# Patient Record
Sex: FEMALE | Race: WHITE | NOT HISPANIC OR LATINO | URBAN - METROPOLITAN AREA
[De-identification: names, ages, dates, MRNs, and addresses within clinical notes are randomized per-mention and may not be internally consistent; named-entity substitution may affect disease eponyms.]

---

## 2018-11-29 ENCOUNTER — EMERGENCY (EMERGENCY)
Facility: HOSPITAL | Age: 58
LOS: 0 days | Discharge: HOME | End: 2018-11-29
Attending: EMERGENCY MEDICINE
Payer: OTHER MISCELLANEOUS

## 2018-11-29 VITALS
HEART RATE: 67 BPM | RESPIRATION RATE: 17 BRPM | TEMPERATURE: 98 F | SYSTOLIC BLOOD PRESSURE: 138 MMHG | DIASTOLIC BLOOD PRESSURE: 67 MMHG | OXYGEN SATURATION: 98 %

## 2018-11-29 VITALS — WEIGHT: 179.9 LBS | HEIGHT: 64 IN

## 2018-11-29 DIAGNOSIS — S62.501A FRACTURE OF UNSPECIFIED PHALANX OF RIGHT THUMB, INITIAL ENCOUNTER FOR CLOSED FRACTURE: ICD-10-CM
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
## 2018-11-29 DIAGNOSIS — M79.644 PAIN IN RIGHT FINGER(S): ICD-10-CM
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
## 2018-11-29 PROCEDURE — 73140 X-RAY EXAM OF FINGER(S): CPT | Mod: 26,RT

## 2018-11-29 PROCEDURE — 99284 EMERGENCY DEPT VISIT MOD MDM: CPT

## 2018-11-29 PROCEDURE — 29130 APPL FINGER SPLINT STATIC: CPT | Mod: F5

## 2018-11-29 RX ORDER — CEPHALEXIN 500 MG
1 CAPSULE ORAL
Qty: 28 | Refills: 0 | OUTPATIENT
Start: 2018-11-29 | End: 2018-12-05

## 2018-11-29 NOTE — ED ADULT TRIAGE NOTE - CHIEF COMPLAINT QUOTE
c/o right thumb injury while at work, door closed on fingers, caused laceration. pt went to urgent center and sent to ER

## 2018-11-29 NOTE — ED STATDOCS - PROGRESS NOTE DETAILS
Patient seen and evaluated with ED attending at intake initially.  +subungual hematoma already draining without intervention, no repairable laceration.  +tuft fx.  Patient placed in splint, will treat with keflex and referred for hand follow up -Preet Dunne PA-C

## 2018-11-29 NOTE — ED ADULT NURSE NOTE - OBJECTIVE STATEMENT
Pt present to ED c/o right thumb pain s/p slamming thumb on refrigerator at work. Pt is able to move thumb, thumb is warm. Bleeding is minimal, controlled with gauze. Right thumb is swollen

## 2018-11-29 NOTE — ED STATDOCS - SKIN, MLM
skin normal color for race, warm, dry and intact. Right finger TTP intrapharyngeal joint. Small laceration of base of nail bed. Small subungual hematoma

## 2018-11-29 NOTE — ED STATDOCS - ATTENDING CONTRIBUTION TO CARE
I, Tomeka Macias MD, personally saw the patient with ACP.  I have personally performed a face to face diagnostic evaluation on this patient.  I have reviewed the ACP note and agree with the history, exam, and plan of care, except as noted.

## 2018-11-29 NOTE — ED STATDOCS - OBJECTIVE STATEMENT
59 y/o female with no significant PMHx  presents to the ED s/p finger injury. Tdap UTD. Pt notes a door closed on her hand. +laceration. Pt went to urgent care and was sent to ED. No other complaints at this time.

## 2018-11-29 NOTE — ED ADULT NURSE NOTE - NSIMPLEMENTINTERV_GEN_ALL_ED
Implemented All Universal Safety Interventions:  [place] to call system. Call bell, personal items and telephone within reach. Instruct patient to call for assistance. Room bathroom lighting operational. Non-slip footwear when patient is off stretcher. Physically safe environment: no spills, clutter or unnecessary equipment. Stretcher in lowest position, wheels locked, appropriate side rails in place.

## 2022-06-21 NOTE — ED STATDOCS - CPE ED MUSC NORM
<<<RESIDENT DISCHARGE NOTE>>>     TABBY EUBANKS  MRN-[id number]    VITAL SIGNS:  T(F): 98 (06-21-22 @ 04:00), Max: 98 (06-21-22 @ 04:00)  HR: 68 (06-21-22 @ 04:00)  BP: 117/57 (06-21-22 @ 04:00)  SpO2: --      PHYSICAL EXAMINATION:  General:  NAD  Head & Neck: NC/AT   Pulmonary: CTA BL  Cardiovascular: RRR   Gastrointestinal/Abdomen & Pelvis:  Soft, NT/ND  Neurologic/Motor: Non focal     TEST RESULTS:                        10.1   7.86  )-----------( 195      ( 20 Jun 2022 06:34 )             31.2       06-20    140  |  108  |  61<HH>  ----------------------------<  104<H>  4.3   |  21  |  1.5    Ca    8.5      20 Jun 2022 06:34  Mg     2.3     06-20        FINAL DISCHARGE INTERVIEW:  Resident(s) Present: (Name:Chilo) RN Present: (Name:  )    DISCHARGE MEDICATION RECONCILIATION  reviewed with Attending (Name:)    DISPOSITION:   [  ] Home,    [  ] Home with Visiting Nursing Services,   [  x  ]  SNF/ NH,    [   ] Acute Rehab (4A),   [   ] Other (Specify:_________) normal...

## 2024-04-22 NOTE — ED STATDOCS - CONDITION AT DISCHARGE:
SW made PRN visit to provide increased support due to pt's imminence.  Pt was received in her SNF room.  She was dressed appropriately and there are no visible signs of abuse/neglect noted.  Pt's eye are open but she is unable to communicate verbally with SW.  Pt unable to maintain any meaningful eye contact with SW. Pt had O2 but nasal canula's were not placed in the nose.  Pt appears comfortable with no facial/non-verbal cues of pain.  Family friend, Shweta walked into the SNF room for a visit.  She states they have been friends of family for 40+ years of both pt/spouse.  She states she has been visiting with pt 2 x week and notes pt has been unable to communicate verbally with them during visits.  Facility staff reports pt still drinking well but minimal bites of food.  Pt does appear to have lost weight over the last 2 weeks as evidenced by increasing loose skin on arms and thinning facial features.  SW notes good urine output with normal coloring.  SW will continue to monitor pt needs.
Improved

## 2024-09-18 ENCOUNTER — OUTPATIENT (OUTPATIENT)
Dept: OUTPATIENT SERVICES | Facility: HOSPITAL | Age: 64
LOS: 1 days | Discharge: HOME | End: 2024-09-18
Payer: COMMERCIAL

## 2024-09-18 VITALS
HEART RATE: 76 BPM | DIASTOLIC BLOOD PRESSURE: 56 MMHG | HEIGHT: 63 IN | SYSTOLIC BLOOD PRESSURE: 121 MMHG | RESPIRATION RATE: 19 BRPM | TEMPERATURE: 97 F | WEIGHT: 160.06 LBS

## 2024-09-18 DIAGNOSIS — D12.2 BENIGN NEOPLASM OF ASCENDING COLON: ICD-10-CM

## 2024-09-18 PROCEDURE — 88305 TISSUE EXAM BY PATHOLOGIST: CPT | Mod: 26

## 2024-09-18 PROCEDURE — C1889: CPT

## 2024-09-18 PROCEDURE — 88305 TISSUE EXAM BY PATHOLOGIST: CPT

## 2024-09-19 LAB — SURGICAL PATHOLOGY STUDY: SIGNIFICANT CHANGE UP

## 2024-09-20 DIAGNOSIS — D12.6 BENIGN NEOPLASM OF COLON, UNSPECIFIED: ICD-10-CM

## 2024-09-20 DIAGNOSIS — D12.4 BENIGN NEOPLASM OF DESCENDING COLON: ICD-10-CM

## 2024-09-20 DIAGNOSIS — E66.01 MORBID (SEVERE) OBESITY DUE TO EXCESS CALORIES: ICD-10-CM

## 2024-09-20 DIAGNOSIS — K63.5 POLYP OF COLON: ICD-10-CM
